# Patient Record
Sex: MALE
[De-identification: names, ages, dates, MRNs, and addresses within clinical notes are randomized per-mention and may not be internally consistent; named-entity substitution may affect disease eponyms.]

---

## 2017-03-02 NOTE — UC
Ear Complaint HPI





- HPI Summary


HPI Summary: 


30 y/o male c/o right sided ear discomfort and muffled sounds, myalgia, fatigue

, sore throat, and fever which is alleviated by taking Tylenol. Symptoms began 

today. 





- History of Current Complaint


Chief Complaint: UCEar


Stated Complaint: EAR CLOGGED


Time Seen by Provider: 03/02/17 19:09


Hx Obtained From: Patient


Onset/Duration: Sudden Onset, Gradual Onset


Severity Initially: Mild


Severity Currently: Mild


Alleviating Factors: OTC Meds - Alleviates all URI like symptoms





- Allergies/Home Medications


Allergies/Adverse Reactions: 


 Allergies











Allergy/AdvReac Type Severity Reaction Status Date / Time


 


No Known Allergies Allergy   Verified 03/02/17 18:59














PMH/Surg Hx/FS Hx/Imm Hx


Previously Healthy: Yes


Endocrine History Of: 


   Denies: Diabetes, Thyroid Disease, Hyperthyroidism, Hypothyroidism, 

Dyslipidemia


Cardiovascular History Of: 


   Denies: Cardiac Disorders, Hypertension, Pacemaker/ICD, Myocardial Infarction

, Congestive Heart Failure, Atrial Fibrillation, Deep Vein Thrombosis, Bleeding 

Disorders


Respiratory History Of: 


   Denies: COPD, Asthma, Bronchitis, Pneumonia, Pulmonary Embolism


GI/ History Of: 


   Denies: Gastroesophageal Reflux, Ulcer, Gastrointestinal Bleed, Gall Bladder 

Disease, Kidney Stones, Diverticulitis, Renal Disease, Urosepsis


Neurological History Of: 


   Denies: TIA, CVA, Dementia, Seizures, Migraine


Psychological History Of: 


   Denies: Anxiety, Depression, Bipolar Disorder, Schizophrenia, Post Traumatic 

Stress Disorder





- Surgical History


Surgical History: None





- Family History


Known Family History: Positive: None





- Social History


Occupation: Employed Full-time


Lives: With Family


Alcohol Use: None


Substance Use Type: None


Smoking Status (MU): Light Every Day Tobacco Smoker


Amount Used/How Often: social smoking


Have You Smoked in the Last Year: Yes





Review of Systems


Constitutional: Fever, Fatigue


Skin: Negative


Eyes: Negative


ENT: Ear Ache - Right sided ear ache


Respiratory: Cough


Cardiovascular: Negative


Gastrointestinal: Negative


Genitourinary: Negative


Motor: Negative


Neurovascular: Negative


Musculoskeletal: Arthralgia, Myalgia


Neurological: Headache, Weakness


Psychological: Negative


All Other Systems Reviewed And Are Negative: Yes





Physical Exam


Triage Information Reviewed: Yes


Appearance: Well-Appearing


Vital Signs: 


 Initial Vital Signs











Temp  97.7 F   03/02/17 18:52


 


Pulse  118   03/02/17 18:52


 


Resp  18   03/02/17 18:52


 


BP  173/102   03/02/17 18:52


 


Pulse Ox  100   03/02/17 18:52











Vital Signs Reviewed: Yes


Eye Exam: Normal


Eyes: Positive: Conjunctiva Clear


ENT: Positive: Hearing grossly normal, Pharynx normal, Nasal congestion, TM 

bulging - Right side


Dental Exam: Normal


Dental: Negative: Cervical Lymphadenopathy


Neck exam: Normal


Neck: Positive: Supple, Nontender, No Lymphadenopathy


Respiratory Exam: Normal


Respiratory: Positive: Chest non-tender, Lungs clear, Normal breath sounds, No 

respiratory distress


Cardiovascular: Positive: RRR, No Murmur, Pulses Normal


Abdominal Exam: Normal


Abdomen Description: Positive: Nontender, No Organomegaly, Soft


Bowel Sounds: Positive: Present


Musculoskeletal Exam: Normal


Musculoskeletal: Positive: Strength Intact, ROM Intact


Neurological Exam: Normal


Neurological: Positive: Alert, Muscle Tone Normal


Psychological Exam: Normal


Skin Exam: Normal





Ear Complaint Course/Dx





- Differential Dx/Diagnosis


Provider Diagnoses: Right otitis media





Discharge





- Discharge Plan


Condition: Stable


Disposition: HOME


Prescriptions: 


Amoxicillin/Clavulanate TAB* [Augmentin *] 875 mg PO BID #10 tab


Patient Education Materials:  Otitis Media (ED)

## 2018-01-30 ENCOUNTER — HOSPITAL ENCOUNTER (EMERGENCY)
Dept: HOSPITAL 25 - UCEAST | Age: 33
Discharge: HOME | End: 2018-01-30
Payer: COMMERCIAL

## 2018-01-30 VITALS — DIASTOLIC BLOOD PRESSURE: 100 MMHG | SYSTOLIC BLOOD PRESSURE: 150 MMHG

## 2018-01-30 DIAGNOSIS — B97.4: ICD-10-CM

## 2018-01-30 DIAGNOSIS — F17.210: ICD-10-CM

## 2018-01-30 DIAGNOSIS — J22: Primary | ICD-10-CM

## 2018-01-30 PROCEDURE — 71046 X-RAY EXAM CHEST 2 VIEWS: CPT

## 2018-01-30 PROCEDURE — 87502 INFLUENZA DNA AMP PROBE: CPT

## 2018-01-30 PROCEDURE — 99212 OFFICE O/P EST SF 10 MIN: CPT

## 2018-01-30 PROCEDURE — G0463 HOSPITAL OUTPT CLINIC VISIT: HCPCS

## 2018-01-30 NOTE — UC
Respiratory Complaint HPI





- HPI Summary


HPI Summary: 





Patient presents with a unremarkable past medical history. He presents today 

with 3 day onset complaints of generalized fatigue and malaise, fever, runny 

nose and cough. He now reports chest congestion and discomfort when he coughs. 

He reports his child was diagnosed with RSV and he believes he has it now also. 

He denies headaches, neck pain, exertional chest pain, abdominal pain, nausea, 

vomiting, or diarrhea.





- History of Current Complaint


Chief Complaint: UCGeneralIllness


Stated Complaint: COUGH RESP ISSUE CONGESTION


Time Seen by Provider: 01/30/18 09:49


Hx Obtained From: Patient


Onset/Duration: Gradual Onset, Lasting Days


Timing: Constant


Severity Initially: Mild


Severity Currently: Moderate


Pain Intensity: 4


Character: Cough: Nonproductive


Alleviating Factors: Spontaneous Resolution


Associated Signs And Symptoms: Positive: Wheezing, URI, Nasal Congestion, Sinus 

Discomfort





- Risk Factors


Pulmonary Embolism Risk Factors: Negative


Cardiac Risk Factors: Negative


Pseudomonas Risk Factors: Negative


Tuberculosis Risk Factors: Negative





- Allergies/Home Medications


Allergies/Adverse Reactions: 


 Allergies











Allergy/AdvReac Type Severity Reaction Status Date / Time


 


No Known Allergies Allergy   Verified 01/30/18 09:21














PMH/Surg Hx/FS Hx/Imm Hx


Previously Healthy: Yes





- Surgical History


Surgical History: None





- Family History


Known Family History: Positive: None





- Social History


Occupation: Employed Full-time


Lives: With Family


Alcohol Use: Occasionally


Substance Use Type: None


Smoking Status (MU): Light Every Day Tobacco Smoker


Amount Used/How Often: social smoking


Have You Smoked in the Last Year: Yes





Review of Systems


Constitutional: Fever, Fatigue


Skin: Negative


Eyes: Negative


ENT: Sore Throat, Nasal Discharge, Sinus Congestion, Sinus Pain/Tenderness


Respiratory: Cough


Cardiovascular: Negative


Gastrointestinal: Negative


Genitourinary: Negative


Motor: Negative


Neurovascular: Negative


Musculoskeletal: Negative


Neurological: Negative


Psychological: Negative


Is Patient Immunocompromised?: No


All Other Systems Reviewed And Are Negative: Yes





Physical Exam


Triage Information Reviewed: Yes


Appearance: Well-Appearing


Vital Signs: 


 Initial Vital Signs











Temp  99.9 F   01/30/18 09:17


 


Pulse  122   01/30/18 09:17


 


Resp  20   01/30/18 09:17


 


BP  182/109   01/30/18 09:17


 


Pulse Ox  99   01/30/18 09:17











Vital Signs Reviewed: Yes


Eye Exam: Normal


ENT: Positive: Pharynx normal, Nasal congestion, Nasal drainage, Sinus 

tenderness


Neck exam: Normal


Neck: Positive: 1


Respiratory Exam: Normal


Respiratory: Positive: No respiratory distress, No accessory muscle use, 

Decreased breath sounds, Rhonchi, Wheezing


Cardiovascular Exam: Normal


Abdominal Exam: Normal


Musculoskeletal Exam: Normal


Neurological Exam: Normal


Psychological Exam: Normal


Skin Exam: Normal





UC Diagnostic Evaluation





- Laboratory


O2 Sat by Pulse Oximetry: 0





Respiratory Course/Dx





- Course


Course Of Treatment: Patient presents with complaints of fever, fatigue, malaise

, with recent exposure to RSV, today an POC RSV was positive, and rapid 

infuenza was negative, ches x-ray was negative. All of the diagnositic reports 

were reviewed with  the patient. He was treated with zpk, prenisone, and 

albuertol. I told his he shoulde stay home from work today, rest and increase 

fluids. Follow up if his symptoms do not improve within 48 hours. He verbalized 

understanding of and was in agreement with the discharge plan.





- Differential Dx/Diagnosis


Differential Diagnosis/HQI/PQRI: Other - RSV


Provider Diagnoses: RSV





Discharge





- Discharge Plan


Condition: Stable


Disposition: HOME


Prescriptions: 


Albuterol HFA INHALER* [Ventolin HFA Inhaler*] 1 puff INH Q4H PRN #1 mdi


 PRN Reason: RSV infection


Azithromycin TAB* [Zithromax TAB (Z-LATESHA) 250 mg #6 tabs] 250 mg PO DAILY #6 tab


predniSONE TAB* [Deltasone TAB*] 20 mg PO BID #10 tab


Patient Education Materials:  Respiratory Syncytial Virus (ED)


Referrals: 


Oscar Acevedo MD [Primary Care Provider] -

## 2018-01-30 NOTE — RAD
INDICATION: Cough     



COMPARISON: None

 

TECHNIQUE: PA and lateral dual-energy views were obtained.



FINDINGS: 



Bones/Soft Tissues: There are no acute bony findings.    



Cardiomediastinal: The cardiomediastinal silhouette is normal. 



Lungs: There are no infiltrates.



Pleura: There are no pleural effusions. 



Other: None



IMPRESSION:  NO ACTIVE DISEASE.

## 2018-02-05 ENCOUNTER — HOSPITAL ENCOUNTER (EMERGENCY)
Dept: HOSPITAL 25 - UCEAST | Age: 33
Discharge: HOME | End: 2018-02-05
Payer: COMMERCIAL

## 2018-02-05 VITALS — SYSTOLIC BLOOD PRESSURE: 165 MMHG | DIASTOLIC BLOOD PRESSURE: 109 MMHG

## 2018-02-05 DIAGNOSIS — H69.93: Primary | ICD-10-CM

## 2018-02-05 DIAGNOSIS — F17.210: ICD-10-CM

## 2018-02-05 PROCEDURE — 99212 OFFICE O/P EST SF 10 MIN: CPT

## 2018-02-05 PROCEDURE — G0463 HOSPITAL OUTPT CLINIC VISIT: HCPCS

## 2018-02-05 NOTE — UC
Ear Complaint HPI





- HPI Summary


HPI Summary: 





Pt presents with b/l earache for the last 2 days. He tells me that he is 

recovering from RSV and is feeling better, but started having ear fullness R>L. 

Says that around this time last year, he had an ear infection that felt the 

same. Denies fever, chills, SOB, chest pain, headache, dizziness.





- History of Current Complaint


Chief Complaint: UCEar


Stated Complaint: EARS CLOGGED


Time Seen by Provider: 02/05/18 19:07


Hx Obtained From: Patient


Onset/Duration: Gradual Onset


Severity Initially: Mild


Severity Currently: Mild


Pain Intensity: 4


Pain Scale Used: 0-10 Numeric





- Allergies/Home Medications


Allergies/Adverse Reactions: 


 Allergies











Allergy/AdvReac Type Severity Reaction Status Date / Time


 


No Known Allergies Allergy   Verified 02/05/18 19:04











Home Medications: 


 Home Medications





guaiFENesin LIQ* [Robitussin*] 5 mg PO Q4H PRN 02/05/18 [History Confirmed 02/05 /18]











PMH/Surg Hx/FS Hx/Imm Hx


Previously Healthy: Yes





- Surgical History


Surgical History: None





- Family History


Known Family History: Positive: None





- Social History


Occupation: Employed Full-time


Lives: With Family


Alcohol Use: Occasionally


Substance Use Type: None


Smoking Status (MU): Current Every Day Smoker


Type: Cigarettes


Amount Used/How Often: 2-3 CIG/WEEK


Have You Smoked in the Last Year: Yes





Review of Systems


Constitutional: Negative


Skin: Negative


Eyes: Negative


ENT: Ear Ache


Respiratory: Negative


Cardiovascular: Negative


Gastrointestinal: Negative


Musculoskeletal: Negative


Neurological: Negative


Psychological: Negative


All Other Systems Reviewed And Are Negative: Yes





Physical Exam


Triage Information Reviewed: Yes


Appearance: Well-Appearing, No Pain Distress, Well-Nourished


Vital Signs: 


 Initial Vital Signs











Temp  98.3 F   02/05/18 19:01


 


Pulse  110   02/05/18 19:01


 


Resp  16   02/05/18 19:01


 


BP  165/109   02/05/18 19:01


 


Pulse Ox  100   02/05/18 19:01











Vital Signs Reviewed: Yes


Eyes: Positive: Conjunctiva Clear.  Negative: Conjunctiva Inflamed, Discharge


ENT: Positive: Hearing grossly normal, Pharynx normal, TMs normal, Uvula 

midline.  Negative: Pharyngeal erythema, Nasal congestion, Nasal drainage, TM 

bulging, TM dull, TM red, Tonsillar swelling, Tonsillar exudate, Hoarse voice, 

Sinus tenderness


Neck: Positive: Supple, Nontender, No Lymphadenopathy


Respiratory: Positive: Chest non-tender, Lungs clear, Normal breath sounds, No 

respiratory distress, No accessory muscle use


Cardiovascular: Positive: RRR, No Murmur, Pulses Normal


Neurological: Positive: Alert


Psychological: Positive: Age Appropriate Behavior


Skin: Negative: rashes





Ear Complaint Course/Dx





- Course


Course Of Treatment: Suspect eustachian tube dysfunction. Advised to try 

Claritin and Flonase.





- Differential Dx/Diagnosis


Provider Diagnoses: eustachian tube dysfunction





Discharge





- Discharge Plan


Condition: Stable


Disposition: HOME


Prescriptions: 


Fluticasone NASAL SPRAY 50MCG* [Flonase NASAL SPRAY 50MCG*] 1 spray BOTH NARES 

DAILY #1 btl


Patient Education Materials:  Earache (ED)


Referrals: 


Oscar Acevedo MD [Primary Care Provider] - 


Additional Instructions: 


If you develop a fever, shortness of breath, chest pain, new or worsening 

symptoms - please call your PCP or go to the ED.


 





Your blood pressure was high at todays visit. Please see your primary provider 

within 4 weeks for recheck and re-evaluation.








1) Please try Claritin once a day in addition to your other medications. You 

may always try Eriberto's Earache drops over-the-counter for any ear pain.

## 2019-07-19 ENCOUNTER — HOSPITAL ENCOUNTER (EMERGENCY)
Dept: HOSPITAL 25 - UCEAST | Age: 34
Discharge: HOME | End: 2019-07-19
Payer: COMMERCIAL

## 2019-07-19 VITALS — DIASTOLIC BLOOD PRESSURE: 113 MMHG | SYSTOLIC BLOOD PRESSURE: 157 MMHG

## 2019-07-19 DIAGNOSIS — B02.9: Primary | ICD-10-CM

## 2019-07-19 DIAGNOSIS — F17.210: ICD-10-CM

## 2019-07-19 DIAGNOSIS — I10: ICD-10-CM

## 2019-07-19 PROCEDURE — G0463 HOSPITAL OUTPT CLINIC VISIT: HCPCS

## 2019-07-19 PROCEDURE — 99212 OFFICE O/P EST SF 10 MIN: CPT

## 2019-07-19 NOTE — UC
Skin Complaint HPI





- HPI Summary


HPI Summary: 





right sided chest pain x 5 days


rash <48 hours


no fever


mild HA


no sob





rash slight pruritic


pain controlled with advil





- History of Current Complaint


Chief Complaint: UCRash


Time Seen by Provider: 07/19/19 10:57


Stated Complaint: RASH


Hx Obtained From: Patient


Onset/Duration: Gradual Onset, Lasting Days


Timing: Constant


Onset Severity: Mild


Current Severity: Mild


Pain Intensity: 4


Pain Scale Used: 0-10 Numeric


Location: Other - right t-5 dermatome


Character: Pruritus, Pain, Raised


Aggravating Factor(s): Nothing


Alleviating Factor(s): Nothing, OTC Creams/Salves - calomine


Associated Signs & Symptoms: Positive: Rash





- Allergy/Home Medications


Allergies/Adverse Reactions: 


 Allergies











Allergy/AdvReac Type Severity Reaction Status Date / Time


 


No Known Allergies Allergy   Verified 07/19/19 10:38











Home Medications: 


 Home Medications





Omeprazole 20 mg PO ONCE 07/19/19 [History Confirmed 07/19/19]











PMH/Surg Hx/FS Hx/Imm Hx


Previously Healthy: Yes


Cardiovascular History: Hypertension - not on meds


GI/ History: Other


Other GI/ History: gastritis





- Surgical History


Surgical History: None


Surgery Procedure, Year, and Place: denies





- Family History


Known Family History: Positive: None





- Social History


Alcohol Use: Occasionally


Substance Use Type: None


Smoking Status (MU): Current Every Day Smoker


Type: Cigarettes


Amount Used/How Often: 2-3 CIG/WEEK


Have You Smoked in the Last Year: Yes





Review of Systems


All Other Systems Reviewed And Are Negative: Yes


Constitutional: Positive: Negative


Skin: Positive: Rash


Eyes: Positive: Negative


ENT: Positive: Negative


Respiratory: Positive: Negative


Cardiovascular: Positive: Negative


Gastrointestinal: Positive: Negative


Genitourinary: Positive: Negative


Motor: Positive: Negative


Neurovascular: Positive: Negative


Musculoskeletal: Positive: Negative


Neurological: Positive: Negative


Psychological: Positive: Negative





Physical Exam


Triage Information Reviewed: Yes


Appearance: Well-Appearing, No Pain Distress, Well-Nourished


Vital Signs: 


 Initial Vital Signs











Temp  98.1 F   07/19/19 10:40


 


Pulse  106   07/19/19 10:40


 


Resp  18   07/19/19 10:40


 


BP  157/113   07/19/19 10:40


 


Pulse Ox  100   07/19/19 10:40











Vital Signs Reviewed: Yes


Eyes: Positive: Conjunctiva Clear


ENT: Positive: Hearing grossly normal.  Negative: Nasal congestion, Nasal 

drainage, Muffled voice, Hoarse voice


Dental Exam: Normal


Neck: Positive: Supple, Nontender, No Lymphadenopathy


Respiratory: Positive: Lungs clear, Normal breath sounds, No respiratory 

distress, No accessory muscle use


Cardiovascular: Positive: RRR, No Murmur


Musculoskeletal: Positive: No Edema


Neurological: Positive: Alert


Psychological Exam: Normal


Skin: Positive: Rashes - vesicular right T-5 dermatome





Course/Dx





- Course


Course Of Treatment: 





patient advised to follow up with PMD re BP





- Diagnoses


Provider Diagnosis: 


 Shingles








Discharge





- Sign-Out/Discharge


Documenting (check all that apply): Patient Departure


All imaging exams completed and their final reports reviewed: No Studies





- Discharge Plan


Condition: Stable


Disposition: HOME


Prescriptions: 


Famciclovir(NF) [Famvir(NF)] 500 mg PO TID #21 tab


Patient Education Materials:  Shingles (ED)


Referrals: 


Oscar Acevedo MD [Primary Care Provider] - 


Additional Instructions: 


recheck for new or worsening symptoms





- Billing Disposition and Condition


Condition: STABLE


Disposition: Home

## 2019-12-28 ENCOUNTER — HOSPITAL ENCOUNTER (EMERGENCY)
Dept: HOSPITAL 25 - UCEAST | Age: 34
Discharge: HOME | End: 2019-12-28
Payer: COMMERCIAL

## 2019-12-28 VITALS — SYSTOLIC BLOOD PRESSURE: 154 MMHG | DIASTOLIC BLOOD PRESSURE: 98 MMHG

## 2019-12-28 DIAGNOSIS — J02.9: Primary | ICD-10-CM

## 2019-12-28 DIAGNOSIS — F17.210: ICD-10-CM

## 2019-12-28 PROCEDURE — 99212 OFFICE O/P EST SF 10 MIN: CPT

## 2019-12-28 PROCEDURE — 87651 STREP A DNA AMP PROBE: CPT

## 2019-12-28 PROCEDURE — G0463 HOSPITAL OUTPT CLINIC VISIT: HCPCS

## 2019-12-28 NOTE — UC
Throat Pain/Nasal Dionicio HPI





- HPI Summary


HPI Summary: 





started with cold symps 1-2 weeks ago, was using NyQuil and felt better until 

last night when sore throat. this am he looked in throat and uvula is red and 

swollen. denies difficulty swallowing or breathing





- History of Current Complaint


Chief Complaint: UCGeneralIllness


Stated Complaint: SORE THROAT


Time Seen by Provider: 12/28/19 10:54


Hx Obtained From: Patient


Onset/Duration: Gradual Onset


Severity: Mild


Pain Intensity: 4


Cough: None


Associated Signs & Symptoms: Positive: Other - nasal congestion





- Allergies/Home Medications


Allergies/Adverse Reactions: 


 Allergies











Allergy/AdvReac Type Severity Reaction Status Date / Time


 


No Known Allergies Allergy   Verified 12/28/19 10:32














PMH/Surg Hx/FS Hx/Imm Hx


Previously Healthy: Yes





- Surgical History


Surgical History: None


Surgery Procedure, Year, and Place: denies





- Family History


Known Family History: Positive: None





- Social History


Occupation: Employed Full-time


Lives: With Family


Alcohol Use: Occasionally


Substance Use Type: None


Smoking Status (MU): Current Some Day Smoker


Type: Cigarettes


Amount Used/How Often: 3 CIG/month


Have You Smoked in the Last Year: Yes


Cessation Counseling: Patient Advised to Stop





Review of Systems


All Other Systems Reviewed And Are Negative: Yes


Constitutional: Positive: Negative


Skin: Positive: Negative.  Negative: Rash


Eyes: Positive: Negative


ENT: Positive: Sore Throat, Sinus Congestion.  Negative: Sinus Pain/Tenderness


Respiratory: Positive: Negative.  Negative: Cough


Cardiovascular: Positive: Negative


Neurological: Positive: Negative


Psychological: Positive: Negative


Is Patient Immunocompromised?: No





Physical Exam


Triage Information Reviewed: Yes


Appearance: Well-Appearing, No Pain Distress, Well-Nourished


Vital Signs: 


 Initial Vital Signs











Temp  98.3 F   12/28/19 10:32


 


Pulse  97   12/28/19 10:32


 


Resp  18   12/28/19 10:32


 


BP  159/112   12/28/19 10:32


 


Pulse Ox  100   12/28/19 10:32











Vital Signs Reviewed: Yes


Eye Exam: Normal


Eyes: Positive: Conjunctiva Clear


ENT: Positive: Pharyngeal erythema, Uvula midline, Other - uvula mod swollen 

and erythemic


Neck exam: Normal


Respiratory Exam: Normal


Respiratory: Positive: Lungs clear


Cardiovascular Exam: Normal


Cardiovascular: Positive: RRR


Musculoskeletal Exam: Normal


Neurological Exam: Normal


Neurological: Positive: Alert


Psychological Exam: Normal


Skin Exam: Normal


Skin: Negative: Rashes





Throat Pain/Nasal Course/Dx





- Differential Dx/Diagnosis


Differential Diagnosis/HQI/PQRI: Influenza, Otitis Media, Pharyngitis, Sinusitis

, Tonsillitis, URI


Provider Diagnosis: 


 Pharyngitis








Discharge ED





- Sign-Out/Discharge


Documenting (check all that apply): Patient Departure


All imaging exams completed and their final reports reviewed: No Studies





- Discharge Plan


Condition: Good


Disposition: HOME


Prescriptions: 


Azithromycin TAB* [Zithromax TAB (Z-LATESHA) 250 mg #6 tabs] 2 tab PO .TODAY, THEN 

1 DAILY #1 latesha


Patient Education Materials:  Pharyngitis (ED)


Referrals: 


Oscar Acevedo MD [Primary Care Provider] - 2 Days (recheck blood 

pressure )


Additional Instructions: 


drink plenty of fluids and rest





start antibiotic (zithromax) and take as prescribed





use Maalox liquid 30-45 minutes before using ibuprofen 


use ibuprofen 600mg every 6 hours as needed for pain and fever (take with food)


You can use Maalox 4 times a day if needed





- Billing Disposition and Condition


Condition: GOOD


Disposition: Home





- Attestation Statements


Provider Attestation: 





I was available for consult. This patient was seen by the [facility]. The patient was 

not presented to, seen by, or examined by me. -[place]